# Patient Record
Sex: FEMALE | Race: WHITE | NOT HISPANIC OR LATINO | Employment: FULL TIME | URBAN - NONMETROPOLITAN AREA
[De-identification: names, ages, dates, MRNs, and addresses within clinical notes are randomized per-mention and may not be internally consistent; named-entity substitution may affect disease eponyms.]

---

## 2022-03-08 LAB — CHLAMYDIA /N. GONORRHOEAE SCREEN: NEGATIVE

## 2023-10-17 LAB
HUMAN PAPILLOMAVIRUS (HPV): NORMAL
PAP RECOMMENDATION EXT: NORMAL

## 2024-02-08 ENCOUNTER — OFFICE VISIT (OUTPATIENT)
Dept: FAMILY MEDICINE | Facility: CLINIC | Age: 24
End: 2024-02-08
Payer: OTHER GOVERNMENT

## 2024-02-08 VITALS
BODY MASS INDEX: 23.36 KG/M2 | TEMPERATURE: 98 F | OXYGEN SATURATION: 100 % | DIASTOLIC BLOOD PRESSURE: 81 MMHG | SYSTOLIC BLOOD PRESSURE: 123 MMHG | RESPIRATION RATE: 17 BRPM | HEIGHT: 64 IN | WEIGHT: 136.81 LBS | HEART RATE: 64 BPM

## 2024-02-08 DIAGNOSIS — Z13.220 SCREENING, LIPID: ICD-10-CM

## 2024-02-08 DIAGNOSIS — R45.89 DEPRESSED MOOD: ICD-10-CM

## 2024-02-08 DIAGNOSIS — E28.39 ESTROGEN DEFICIENCY: Primary | ICD-10-CM

## 2024-02-08 DIAGNOSIS — R53.83 FATIGUE, UNSPECIFIED TYPE: ICD-10-CM

## 2024-02-08 DIAGNOSIS — R79.89 ELEVATED SERUM FREE T4 LEVEL: ICD-10-CM

## 2024-02-08 PROBLEM — S83.512A LEFT ACL TEAR: Status: RESOLVED | Noted: 2018-10-16 | Resolved: 2024-02-08

## 2024-02-08 PROBLEM — S83.512A LEFT ACL TEAR: Status: ACTIVE | Noted: 2018-10-16

## 2024-02-08 LAB
ALBUMIN SERPL BCP-MCNC: 3.8 G/DL (ref 3.5–5)
ALBUMIN/GLOB SERPL: 1.1 {RATIO}
ALP SERPL-CCNC: 50 U/L (ref 52–144)
ALT SERPL W P-5'-P-CCNC: 21 U/L (ref 13–56)
ANION GAP SERPL CALCULATED.3IONS-SCNC: 7 MMOL/L (ref 7–16)
AST SERPL W P-5'-P-CCNC: 23 U/L (ref 15–37)
BILIRUB SERPL-MCNC: 0.3 MG/DL (ref ?–1.2)
BUN SERPL-MCNC: 14 MG/DL (ref 7–18)
BUN/CREAT SERPL: 18 (ref 6–20)
CALCIUM SERPL-MCNC: 9.2 MG/DL (ref 8.5–10.1)
CHLORIDE SERPL-SCNC: 106 MMOL/L (ref 98–107)
CHOLEST SERPL-MCNC: 164 MG/DL (ref 0–200)
CHOLEST/HDLC SERPL: 2.3 {RATIO}
CO2 SERPL-SCNC: 29 MMOL/L (ref 21–32)
CREAT SERPL-MCNC: 0.76 MG/DL (ref 0.55–1.02)
EGFR (NO RACE VARIABLE) (RUSH/TITUS): 114 ML/MIN/1.73M2
GLOBULIN SER-MCNC: 3.6 G/DL (ref 2–4)
GLUCOSE SERPL-MCNC: 88 MG/DL (ref 74–106)
HDLC SERPL-MCNC: 70 MG/DL (ref 40–60)
LDLC SERPL CALC-MCNC: 83 MG/DL
LDLC/HDLC SERPL: 1.2 {RATIO}
NONHDLC SERPL-MCNC: 94 MG/DL
POTASSIUM SERPL-SCNC: 4 MMOL/L (ref 3.5–5.1)
PROT SERPL-MCNC: 7.4 G/DL (ref 6.4–8.2)
SODIUM SERPL-SCNC: 138 MMOL/L (ref 136–145)
T4 FREE SERPL-MCNC: 1.1 NG/DL (ref 0.76–1.46)
TRIGL SERPL-MCNC: 54 MG/DL (ref 35–150)
TSH SERPL DL<=0.005 MIU/L-ACNC: 1.49 UIU/ML (ref 0.36–3.74)
VLDLC SERPL-MCNC: 11 MG/DL

## 2024-02-08 PROCEDURE — 84439 ASSAY OF FREE THYROXINE: CPT | Mod: ,,, | Performed by: CLINICAL MEDICAL LABORATORY

## 2024-02-08 PROCEDURE — 84443 ASSAY THYROID STIM HORMONE: CPT | Mod: ,,, | Performed by: CLINICAL MEDICAL LABORATORY

## 2024-02-08 PROCEDURE — 80061 LIPID PANEL: CPT | Mod: ,,, | Performed by: CLINICAL MEDICAL LABORATORY

## 2024-02-08 PROCEDURE — 80053 COMPREHEN METABOLIC PANEL: CPT | Mod: ,,, | Performed by: CLINICAL MEDICAL LABORATORY

## 2024-02-08 PROCEDURE — 99204 OFFICE O/P NEW MOD 45 MIN: CPT | Mod: ,,, | Performed by: STUDENT IN AN ORGANIZED HEALTH CARE EDUCATION/TRAINING PROGRAM

## 2024-02-08 RX ORDER — ESCITALOPRAM OXALATE 5 MG/1
5 TABLET ORAL DAILY
Qty: 30 TABLET | Refills: 0 | Status: SHIPPED | OUTPATIENT
Start: 2024-02-08 | End: 2024-03-07

## 2024-02-08 NOTE — Clinical Note
Please make sure referral team takes care of this patient's referral ASAP. Please refer to [facility] in [place], [place], Fax [phone]. Patient already has an appt. She just needs referral for insurance purposes.

## 2024-02-08 NOTE — PROGRESS NOTES
Health Maintenance Due   Topic Date Due    Hepatitis C Screening  Never done    Lipid Panel  Never done    COVID-19 Vaccine (1) Never done    TETANUS VACCINE  Never done    Pap Smear  Never done    Influenza Vaccine (1) 09/01/2023     Care gaps reviewed. Patient declined vaccines at this time.

## 2024-02-08 NOTE — Clinical Note
Please obtain records from [facility] in [place] and from [facility] in [place], [place].

## 2024-02-08 NOTE — PROGRESS NOTES
Progress Note     ANDI COX MD   [address]  [place] [address]     PATIENT NAME: Vilma Mcfarland  : 2000  DATE: 24  MRN: [id number]      Billing Provider: ANDI COX MD  Level of Service:   Patient PCP Information       Provider PCP Type    ANDI COX MD General                Establish Care (Patient reports needing to establish care for PCP) and Referral (Patient needs referral to OBGYN./Patient went to urgent care and was told her estrogen levels were low. /She called to make an appointment with an OB but her insurance required she get a referral from a PCP. /Patient wants to be referred to [facility] in [place], [place]  fax- [phone])      SUBJECTIVE:     Vilma Mcfarland is a 23 y.o.female who presents to clinic for Establish Care (Patient reports needing to establish care for PCP) and Referral (Patient needs referral to OBGYN./Patient went to urgent care and was told her estrogen levels were low. /She called to make an appointment with an OB but her insurance required she get a referral from a PCP. /Patient wants to be referred to [facility] in [place], [place]  fax- [phone])    Patient presents to clinic today for referral to OBGYN.  Patient was evaluated at [facility] in [place].  Patient was having some fatigue and presented to [facility] in [place].  They did blood work including estradiol, B12, TSH, vitamin-D, and testosterone.  They also performed a CBC.  Patient's CBC was unremarkable. B12 375,TSH 2.27,T4 12.3, Vit D 63,Test 0.4, estradiol <15.  At the time her blood work was drawn she was approximately 2 weeks into her cycle.  Her provider recommended that she be evaluated by OBGYN for estrogen deficiency.  Patient is needing referral for insurance purposes.  Patient already has an appointment with OBGYN.  Patient is currently on birth control.  She has been on combined oral contraceptive pills since high  "school.    Had pap smear last year. It was performed at [facility], [place].     Patient notes that she continues to have fatigue depressed mood.  She states that she has always had difficulty with these symptoms.  She gets quite irritable with her significant other as well as family and friends.  Patient states that she has little desire to go out and been time with others.  She also has some difficulty concentrating.  She wakes frequently throughout the night.  Patient denies any significant anxiety but did have some difficulty sleeping secondary to racing thoughts when she was in school.  That has improved since she graduated.  Patient has associated fatigue. No SI.     All other pertinent review of systems negative. Please see HPI for details.     Past Medical History:  has a past medical history of Left ACL tear (10/16/2018).   Past Surgical History:  has a past surgical history that includes Anterior cruciate ligament repair (Left, 2019).  Family History: family history is not on file.  Social History:  reports that she quit smoking about 2 years ago. Her smoking use included vaping with nicotine. She started smoking about 4 years ago. She has never used smokeless tobacco. She reports current alcohol use of about 3.0 standard drinks of alcohol per week. She reports that she does not use drugs.  Allergies: Review of patient's allergies indicates:  No Known Allergies      Current Outpatient Medications:     ethinyl estradiol/drospirenone (STACI ORAL), Take 0.03 mg by mouth once daily., Disp: , Rfl:     EScitalopram oxalate (LEXAPRO) 5 MG Tab, Take 1 tablet (5 mg total) by mouth once daily., Disp: 30 tablet, Rfl: 0   OBJECTIVE:     Vital Signs   /81 (BP Location: Right arm, Patient Position: Sitting, BP Method: Medium (Automatic))   Pulse 64   Temp 98.2 °F (36.8 °C) (Temporal)   Resp 17   Ht 5' 4" (1.626 m)   Wt 62.1 kg (136 lb 12.8 oz)   SpO2 100%   BMI 23.48 kg/m² "     Physical Exam  Constitutional:       General: She is not in acute distress.     Appearance: Normal appearance. She is not ill-appearing, toxic-appearing or diaphoretic.   HENT:      Head: Normocephalic and atraumatic.      Mouth/Throat:      Mouth: Mucous membranes are moist.      Pharynx: No oropharyngeal exudate or posterior oropharyngeal erythema.   Eyes:      Extraocular Movements: Extraocular movements intact.      Pupils: Pupils are equal, round, and reactive to light.   Cardiovascular:      Rate and Rhythm: Normal rate and regular rhythm.      Pulses: Normal pulses.      Heart sounds: Normal heart sounds. No murmur heard.     No friction rub. No gallop.   Pulmonary:      Effort: Pulmonary effort is normal. No respiratory distress.      Breath sounds: No wheezing, rhonchi or rales.   Abdominal:      General: Abdomen is flat.      Palpations: Abdomen is soft.      Tenderness: There is no abdominal tenderness. There is no guarding or rebound.   Musculoskeletal:         General: Normal range of motion.      Cervical back: Normal range of motion.   Skin:     General: Skin is warm and dry.      Capillary Refill: Capillary refill takes less than 2 seconds.   Neurological:      General: No focal deficit present.      Mental Status: She is alert.   Psychiatric:         Mood and Affect: Mood normal.         Behavior: Behavior normal.         ASSESSMENT/PLAN:     1. Estrogen deficiency  -     Ambulatory referral/consult to Obstetrics / Gynecology; Future; Expected date: 02/15/2024    Patient with low estrogen level on blood work.  It was recommended patient follow up with OBGYN.  Patient already has an appointment with OBGYN.  Patient is on combined oral contraceptive pills which is possibly contributing to her decreased estradiol level.  Will refer per patient request.    2. Depressed mood  -     EScitalopram oxalate (LEXAPRO) 5 MG Tab; Take 1 tablet (5 mg total) by mouth once daily.  Dispense: 30 tablet; Refill:  0  Patient with depressed mood.  We will treat with low-dose Lexapro see if she is improvement in symptoms.  Discussed potential risks and side effects of this medication including black box warning.  Patient verbalized understanding.  Patient to follow up in 1 month for recheck.    3. Fatigue, unspecified type  -     Comprehensive Metabolic Panel; Future; Expected date: 02/08/2024  Patient with fatigue.  Patient with recent blood work which showed low normal vitamin B12 level.  Patient to start vitamin B12 supplementation.  We will also obtain CMP as that was not obtained during her routine blood work recently.  We will also obtain repeat thyroid level given abnormal T4.    4. Screening, lipid  -     Lipid Panel; Future; Expected date: 02/08/2024    5. Elevated serum free T4 level  -     TSH; Future; Expected date: 02/08/2024  -     T4, Free; Future; Expected date: 02/08/2024        Follow up in about 4 weeks (around 3/7/2024) for Recheck depression.      ANDI COX MD  02/08/2024    Due to voice recognition software, sound alike and misspelled words may be contained in the documentation.

## 2024-02-09 NOTE — PROGRESS NOTES
Please let patient know that her electrolytes, kidney function, and liver enzymes are normal.  Her alkaline phosphatase is mildly low.  We will need to repeat this level at her follow up visit to make sure it returns to normal.  Her cholesterol is normal.  Her thyroid level has also normalized.

## 2024-02-23 ENCOUNTER — PATIENT OUTREACH (OUTPATIENT)
Dept: ADMINISTRATIVE | Facility: HOSPITAL | Age: 24
End: 2024-02-23

## 2024-02-23 NOTE — PROGRESS NOTES
02/23/2024   --Chart accessed for: Care gaps  --Care Gaps addressed:pap smear, HPV, Chlamydia Screening.  Care Everywhere updates requested and reviewed.  [facility] reviewed.  Immunization Database ([employer]/MIXX) reviewed. Vaccinations uploaded:n/a  Next appointment 3/7/2024 . Appointment notes updated to include: due for chlamydia screening and Hep C    Health Maintenance Due   Topic Date Due    Hepatitis C Screening  Never done    COVID-19 Vaccine (1) Never done    Chlamydia Screening  03/04/2023    Influenza Vaccine (1) 09/01/2023

## 2024-03-07 ENCOUNTER — OFFICE VISIT (OUTPATIENT)
Dept: FAMILY MEDICINE | Facility: CLINIC | Age: 24
End: 2024-03-07
Payer: OTHER GOVERNMENT

## 2024-03-07 VITALS
DIASTOLIC BLOOD PRESSURE: 65 MMHG | HEIGHT: 64 IN | WEIGHT: 135 LBS | SYSTOLIC BLOOD PRESSURE: 104 MMHG | HEART RATE: 71 BPM | TEMPERATURE: 98 F | RESPIRATION RATE: 18 BRPM | OXYGEN SATURATION: 98 % | BODY MASS INDEX: 23.05 KG/M2

## 2024-03-07 DIAGNOSIS — Z11.3 SCREENING EXAMINATION FOR STD (SEXUALLY TRANSMITTED DISEASE): ICD-10-CM

## 2024-03-07 DIAGNOSIS — R45.89 DEPRESSED MOOD: Primary | ICD-10-CM

## 2024-03-07 DIAGNOSIS — Z30.09 BIRTH CONTROL COUNSELING: ICD-10-CM

## 2024-03-07 PROCEDURE — 99214 OFFICE O/P EST MOD 30 MIN: CPT | Mod: ,,, | Performed by: STUDENT IN AN ORGANIZED HEALTH CARE EDUCATION/TRAINING PROGRAM

## 2024-03-07 PROCEDURE — 87491 CHLMYD TRACH DNA AMP PROBE: CPT | Mod: ,,, | Performed by: CLINICAL MEDICAL LABORATORY

## 2024-03-07 PROCEDURE — 87591 N.GONORRHOEAE DNA AMP PROB: CPT | Mod: ,,, | Performed by: CLINICAL MEDICAL LABORATORY

## 2024-03-07 PROCEDURE — 86803 HEPATITIS C AB TEST: CPT | Mod: ,,, | Performed by: CLINICAL MEDICAL LABORATORY

## 2024-03-07 RX ORDER — ESCITALOPRAM OXALATE 10 MG/1
10 TABLET ORAL DAILY
Qty: 30 TABLET | Refills: 2 | Status: SHIPPED | OUTPATIENT
Start: 2024-03-07 | End: 2024-05-30

## 2024-03-07 RX ORDER — DROSPIRENONE AND ETHINYL ESTRADIOL 0.03MG-3MG
1 KIT ORAL DAILY
Qty: 28 TABLET | Refills: 5 | Status: SHIPPED | OUTPATIENT
Start: 2024-03-07 | End: 2025-03-07

## 2024-03-07 RX ORDER — DROSPIRENONE AND ETHINYL ESTRADIOL 0.02-3(28)
KIT ORAL
COMMUNITY
Start: 2023-12-14 | End: 2024-03-07

## 2024-03-07 NOTE — Clinical Note
Patient states she had her covid vaccine done at [facility] in [place]. She may have her flu vaccine as well we will have to look it up.

## 2024-03-07 NOTE — PROGRESS NOTES
Progress Note     ANDI COX MD   [address]  [place] [address]     PATIENT NAME: Vilma Mcfarland  : 2000  DATE: 3/7/24  MRN: [id number]      Billing Provider: ANDI COX MD  Level of Service:   Patient PCP Information       Provider PCP Type    ANDI COX MD General                Follow-up (Patient states she is here for a follow up for the new medicine that she was put on. Patient states that her sleeping has not gotten better she's still tossing and turning.Patient states the depression she really haven't  noticed anything with that. Patient states she would like to be scheduled for a pap smear.) and Health Maintenance (Patient states that she's interested in getting her chlamydia screening and Hepatitis C screening.)      SUBJECTIVE:     Vilma Mcfarland is a 23 y.o.female who presents to clinic for Follow-up (Patient states she is here for a follow up for the new medicine that she was put on. Patient states that her sleeping has not gotten better she's still tossing and turning.Patient states the depression she really haven't  noticed anything with that. Patient states she would like to be scheduled for a pap smear.) and Health Maintenance (Patient states that she's interested in getting her chlamydia screening and Hepatitis C screening.)    Patient presents to clinic today for follow up depression.  Patient was started on Lexapro 5 mg approximately 1 month ago.  Patient notes that she has noticed some mild improvement but continues to have difficulty with sleep.  She was tolerating the medication without difficulty.  Patient is amenable to increasing the dosage.    Patient would also like to have her hepatitis-C and chlamydia screening performed today.  Patient has had chlamydia in the past per chart review.  Patient is up-to-date on Pap smear in last had Pap smear performed in 2022.  Patient does take oral contraceptive pills.  Patient is  "requesting refill.    All other pertinent review of systems negative. Please see HPI for details.     Past Medical History:  has a past medical history of Left ACL tear (10/16/2018).   Past Surgical History:  has a past surgical history that includes Anterior cruciate ligament repair (Left, 2019).  Family History: family history is not on file.  Social History:  reports that she quit smoking about 2 years ago. Her smoking use included vaping with nicotine. She started smoking about 4 years ago. She has never used smokeless tobacco. She reports current alcohol use of about 3.0 standard drinks of alcohol per week. She reports that she does not use drugs.  Allergies: Review of patient's allergies indicates:  No Known Allergies      Current Outpatient Medications:     drospirenone-ethinyl estradioL (STACI) 3-0.03 mg per tablet, Take 1 tablet by mouth once daily., Disp: 28 tablet, Rfl: 5    EScitalopram oxalate (LEXAPRO) 10 MG tablet, Take 1 tablet (10 mg total) by mouth once daily., Disp: 30 tablet, Rfl: 2   OBJECTIVE:     Vital Signs   /65 (BP Location: Right arm, Patient Position: Sitting, BP Method: Medium (Automatic))   Pulse 71   Temp 98.1 °F (36.7 °C) (Oral)   Resp 18   Ht 5' 4" (1.626 m)   Wt 61.2 kg (135 lb)   LMP 03/03/2024 (Exact Date)   SpO2 98%   BMI 23.17 kg/m²     Physical Exam  Constitutional:       General: She is not in acute distress.     Appearance: Normal appearance. She is not ill-appearing, toxic-appearing or diaphoretic.   HENT:      Head: Normocephalic and atraumatic.      Right Ear: Tympanic membrane normal.      Left Ear: Tympanic membrane normal.      Nose: No congestion or rhinorrhea.      Mouth/Throat:      Mouth: Mucous membranes are moist.      Pharynx: No oropharyngeal exudate or posterior oropharyngeal erythema.   Eyes:      Extraocular Movements: Extraocular movements intact.      Pupils: Pupils are equal, round, and reactive to light.   Cardiovascular:      Rate and " Rhythm: Normal rate and regular rhythm.      Pulses: Normal pulses.      Heart sounds: Normal heart sounds. No murmur heard.     No friction rub. No gallop.   Pulmonary:      Effort: Pulmonary effort is normal. No respiratory distress.      Breath sounds: No wheezing, rhonchi or rales.   Abdominal:      General: Abdomen is flat.      Palpations: Abdomen is soft.      Tenderness: There is no abdominal tenderness. There is no guarding or rebound.   Musculoskeletal:         General: Normal range of motion.      Cervical back: Normal range of motion.   Skin:     General: Skin is warm and dry.      Capillary Refill: Capillary refill takes less than 2 seconds.   Neurological:      General: No focal deficit present.      Mental Status: She is alert.   Psychiatric:         Mood and Affect: Mood normal.         Behavior: Behavior normal.         ASSESSMENT/PLAN:     1. Depressed mood  -     EScitalopram oxalate (LEXAPRO) 10 MG tablet; Take 1 tablet (10 mg total) by mouth once daily.  Dispense: 30 tablet; Refill: 2    Patient currently tolerating Lexapro 5 mg.  Patient has noticed mild improvement in symptoms but continued some difficulty with sleep.  We will increase Lexapro to 10 mg daily.  Patient to follow up in 3 months if doing well.  Otherwise, patient to follow up in 1 month for recheck to potentially adjust medication dosage versus start adjunct or transition to an alternative.    2. Birth control counseling  -     drospirenone-ethinyl estradioL (STACI) 3-0.03 mg per tablet; Take 1 tablet by mouth once daily.  Dispense: 28 tablet; Refill: 5  Patient requesting refill for her birth control.  Patient is currently taking her medication has not missed any doses.    3. Screening examination for STD (sexually transmitted disease)  -     Chlamydia/GC, PCR  -     Hepatitis C Antibody; Future; Expected date: 03/07/2024  Patient requesting STI screening for gonorrhea/chlamydia and hepatitis-C.  We will obtain.        Follow up  in about 3 months (around 6/7/2024) for Recheck Depression.      ANDI COX MD  03/07/2024    Due to voice recognition software, sound alike and misspelled words may be contained in the documentation.

## 2024-03-08 LAB — HCV AB SER QL: NORMAL

## 2024-03-09 LAB
CHLAMYDIA BY PCR: NEGATIVE
N. GONORRHOEAE (GC) BY PCR: NEGATIVE

## 2024-05-30 DIAGNOSIS — R45.89 DEPRESSED MOOD: ICD-10-CM

## 2024-05-30 RX ORDER — ESCITALOPRAM OXALATE 10 MG/1
10 TABLET ORAL
Qty: 30 TABLET | Refills: 2 | Status: SHIPPED | OUTPATIENT
Start: 2024-05-30 | End: 2024-06-07 | Stop reason: SDUPTHER

## 2024-06-07 ENCOUNTER — OFFICE VISIT (OUTPATIENT)
Dept: FAMILY MEDICINE | Facility: CLINIC | Age: 24
End: 2024-06-07
Payer: OTHER GOVERNMENT

## 2024-06-07 VITALS
OXYGEN SATURATION: 98 % | HEIGHT: 64 IN | DIASTOLIC BLOOD PRESSURE: 74 MMHG | RESPIRATION RATE: 18 BRPM | SYSTOLIC BLOOD PRESSURE: 118 MMHG | HEART RATE: 60 BPM | BODY MASS INDEX: 23.67 KG/M2 | TEMPERATURE: 98 F | WEIGHT: 138.63 LBS

## 2024-06-07 DIAGNOSIS — N89.8 VAGINAL DISCHARGE: Primary | ICD-10-CM

## 2024-06-07 DIAGNOSIS — R45.89 DEPRESSED MOOD: ICD-10-CM

## 2024-06-07 LAB
CANDIDA SPECIES: NEGATIVE
GARDNERELLA: POSITIVE
TRICHOMONAS: NEGATIVE

## 2024-06-07 PROCEDURE — 99213 OFFICE O/P EST LOW 20 MIN: CPT | Mod: ,,, | Performed by: STUDENT IN AN ORGANIZED HEALTH CARE EDUCATION/TRAINING PROGRAM

## 2024-06-07 PROCEDURE — 87660 TRICHOMONAS VAGIN DIR PROBE: CPT | Mod: 59,,, | Performed by: CLINICAL MEDICAL LABORATORY

## 2024-06-07 PROCEDURE — 87591 N.GONORRHOEAE DNA AMP PROB: CPT | Mod: ,,, | Performed by: CLINICAL MEDICAL LABORATORY

## 2024-06-07 PROCEDURE — 87661 TRICHOMONAS VAGINALIS AMPLIF: CPT | Mod: ,,, | Performed by: CLINICAL MEDICAL LABORATORY

## 2024-06-07 PROCEDURE — 87510 GARDNER VAG DNA DIR PROBE: CPT | Mod: ,,, | Performed by: CLINICAL MEDICAL LABORATORY

## 2024-06-07 PROCEDURE — 87491 CHLMYD TRACH DNA AMP PROBE: CPT | Mod: ,,, | Performed by: CLINICAL MEDICAL LABORATORY

## 2024-06-07 PROCEDURE — 87480 CANDIDA DNA DIR PROBE: CPT | Mod: ,,, | Performed by: CLINICAL MEDICAL LABORATORY

## 2024-06-07 RX ORDER — DROSPIRENONE AND ETHINYL ESTRADIOL 0.02-3(28)
1 KIT ORAL
COMMUNITY
Start: 2024-05-07 | End: 2024-06-07

## 2024-06-07 RX ORDER — ESCITALOPRAM OXALATE 10 MG/1
10 TABLET ORAL DAILY
Qty: 90 TABLET | Refills: 1 | Status: SHIPPED | OUTPATIENT
Start: 2024-06-07

## 2024-06-07 NOTE — PROGRESS NOTES
Progress Note     ANDI COX MD   [address]  [place] [address]     PATIENT NAME: Vilma Mcfarland  : 2000  DATE: 24  MRN: [id number]      Billing Provider: ANDI COX MD  Level of Service:   Patient PCP Information       Provider PCP Type    ADNI COX MD General                Vaginal Discharge (Abnormal vaginal discharge/No burning /No itching/), Follow-up, Anxiety (Follow up /Pt mentioned everything is going on well and the lexapro work perfectly fine ), and Health Maintenance (Pt not interested in the covid booster )      SUBJECTIVE:     Vilma Mcfarland is a 24 y.o.female who presents to clinic for Vaginal Discharge (Abnormal vaginal discharge/No burning /No itching/), Follow-up, Anxiety (Follow up /Pt mentioned everything is going on well and the lexapro work perfectly fine ), and Health Maintenance (Pt not interested in the covid booster )        Patient presents to clinic today for follow up for depression.  Patient notes that symptoms are much improved with Lexapro.  She   Notes she is tolerating the medication without difficulty and notes significant improvement in ability to sleep. Her mood is much improved as well.      Patient was also having some vaginal discharge without any associated burning/itching.  Patient was swimming during Memorial day weekend and believes that has precipitated the discharge.  She notes it was thin and white in color.  She was sexually active but it was in a stable relationship.  Patient was amenable to STI testing in addition to BD affirm.    All other pertinent review of systems negative. Please see HPI for details.     Past Medical History:  has a past medical history of Left ACL tear (10/16/2018).   Past Surgical History:  has a past surgical history that includes Anterior cruciate ligament repair (Left, 2019).  Family History: family history is not on file.  Social History:  reports that she quit smoking about 2  "years ago. Her smoking use included vaping with nicotine. She started smoking about 4 years ago. She has never used smokeless tobacco. She reports current alcohol use of about 3.0 standard drinks of alcohol per week. She reports that she does not use drugs.  Allergies: Review of patient's allergies indicates:  No Known Allergies      Current Outpatient Medications:     drospirenone-ethinyl estradioL (STACI) 3-0.03 mg per tablet, Take 1 tablet by mouth once daily., Disp: 28 tablet, Rfl: 5    EScitalopram oxalate (LEXAPRO) 10 MG tablet, Take 1 tablet (10 mg total) by mouth once daily., Disp: 90 tablet, Rfl: 1   OBJECTIVE:     Vital Signs   /74   Pulse 60   Temp 97.6 °F (36.4 °C)   Resp 18   Ht 5' 4" (1.626 m)   Wt 62.9 kg (138 lb 9.6 oz)   SpO2 98%   BMI 23.79 kg/m²     Physical Exam  Constitutional:       General: She is not in acute distress.     Appearance: Normal appearance. She is not ill-appearing, toxic-appearing or diaphoretic.   HENT:      Head: Normocephalic and atraumatic.   Eyes:      Extraocular Movements: Extraocular movements intact.      Pupils: Pupils are equal, round, and reactive to light.   Cardiovascular:      Rate and Rhythm: Normal rate and regular rhythm.      Pulses: Normal pulses.      Heart sounds: Normal heart sounds. No murmur heard.     No friction rub. No gallop.   Pulmonary:      Effort: Pulmonary effort is normal. No respiratory distress.      Breath sounds: No wheezing, rhonchi or rales.   Abdominal:      General: Abdomen is flat.      Palpations: Abdomen is soft.      Tenderness: There is no abdominal tenderness. There is no guarding or rebound.   Musculoskeletal:         General: Normal range of motion.      Cervical back: Normal range of motion.   Skin:     General: Skin is warm and dry.      Capillary Refill: Capillary refill takes less than 2 seconds.   Neurological:      General: No focal deficit present.      Mental Status: She is alert.   Psychiatric:         Mood " and Affect: Mood normal.         Behavior: Behavior normal.         ASSESSMENT/PLAN:     1. Vaginal discharge  -     Chlamydia/GC, PCR  -     Cancel: Trichomonas vaginalis by PCR; Future; Expected date: 06/07/2024  -     Bacterial Vaginosis    Obtaining GC/chlamydia and BD affirm for further evaluation.  We will treat if indicated.      2. Depressed mood  -     EScitalopram oxalate (LEXAPRO) 10 MG tablet; Take 1 tablet (10 mg total) by mouth once daily.  Dispense: 90 tablet; Refill: 1  Patient doing well with Lexapro.  Refill provided.  Patient to follow up if symptoms worsen.      Follow up in about 6 months (around 12/7/2024) for FU depression.      ANDI COX MD  06/07/2024    Due to voice recognition software, sound alike and misspelled words may be contained in the documentation.

## 2024-06-08 DIAGNOSIS — B96.89 BV (BACTERIAL VAGINOSIS): Primary | ICD-10-CM

## 2024-06-08 DIAGNOSIS — N76.0 BV (BACTERIAL VAGINOSIS): Primary | ICD-10-CM

## 2024-06-08 LAB
CHLAMYDIA BY PCR: NEGATIVE
N. GONORRHOEAE (GC) BY PCR: NEGATIVE
TRICHOMONAS NAT: NEGATIVE

## 2024-06-08 RX ORDER — METRONIDAZOLE 500 MG/1
500 TABLET ORAL EVERY 12 HOURS
Qty: 14 TABLET | Refills: 0 | Status: SHIPPED | OUTPATIENT
Start: 2024-06-08 | End: 2024-06-15

## 2024-09-02 DIAGNOSIS — R45.89 DEPRESSED MOOD: ICD-10-CM

## 2024-09-03 RX ORDER — ESCITALOPRAM OXALATE 10 MG/1
10 TABLET ORAL
Qty: 90 TABLET | Refills: 0 | Status: SHIPPED | OUTPATIENT
Start: 2024-09-03

## 2024-09-06 ENCOUNTER — PATIENT MESSAGE (OUTPATIENT)
Dept: FAMILY MEDICINE | Facility: CLINIC | Age: 24
End: 2024-09-06
Payer: OTHER GOVERNMENT

## 2024-11-01 ENCOUNTER — OFFICE VISIT (OUTPATIENT)
Dept: FAMILY MEDICINE | Facility: CLINIC | Age: 24
End: 2024-11-01
Payer: OTHER GOVERNMENT

## 2024-11-01 VITALS
TEMPERATURE: 98 F | DIASTOLIC BLOOD PRESSURE: 80 MMHG | BODY MASS INDEX: 23.9 KG/M2 | WEIGHT: 140 LBS | HEART RATE: 87 BPM | OXYGEN SATURATION: 98 % | HEIGHT: 64 IN | SYSTOLIC BLOOD PRESSURE: 118 MMHG | RESPIRATION RATE: 18 BRPM

## 2024-11-01 DIAGNOSIS — F41.9 ANXIETY: Primary | ICD-10-CM

## 2024-11-01 DIAGNOSIS — R45.89 DEPRESSED MOOD: ICD-10-CM

## 2024-11-01 RX ORDER — ESCITALOPRAM OXALATE 10 MG/1
10 TABLET ORAL DAILY
Qty: 90 TABLET | Refills: 1 | Status: SHIPPED | OUTPATIENT
Start: 2024-11-01

## 2024-11-01 RX ORDER — BUSPIRONE HYDROCHLORIDE 5 MG/1
5 TABLET ORAL 2 TIMES DAILY
Qty: 60 TABLET | Refills: 3 | Status: SHIPPED | OUTPATIENT
Start: 2024-11-01 | End: 2025-11-01

## 2024-11-17 DIAGNOSIS — Z30.09 BIRTH CONTROL COUNSELING: ICD-10-CM

## 2024-11-18 RX ORDER — DROSPIRENONE AND ETHINYL ESTRADIOL 0.03MG-3MG
1 KIT ORAL
Qty: 28 TABLET | Refills: 2 | Status: SHIPPED | OUTPATIENT
Start: 2024-11-18

## 2024-11-20 ENCOUNTER — PATIENT MESSAGE (OUTPATIENT)
Dept: FAMILY MEDICINE | Facility: CLINIC | Age: 24
End: 2024-11-20
Payer: OTHER GOVERNMENT

## 2025-03-11 ENCOUNTER — OFFICE VISIT (OUTPATIENT)
Dept: FAMILY MEDICINE | Facility: CLINIC | Age: 25
End: 2025-03-11
Payer: OTHER GOVERNMENT

## 2025-03-11 VITALS
HEIGHT: 64 IN | OXYGEN SATURATION: 100 % | HEART RATE: 63 BPM | SYSTOLIC BLOOD PRESSURE: 121 MMHG | RESPIRATION RATE: 20 BRPM | WEIGHT: 127.38 LBS | TEMPERATURE: 98 F | DIASTOLIC BLOOD PRESSURE: 73 MMHG | BODY MASS INDEX: 21.75 KG/M2

## 2025-03-11 DIAGNOSIS — R45.89 DEPRESSED MOOD: Primary | ICD-10-CM

## 2025-03-11 DIAGNOSIS — R61 NIGHT SWEATS: ICD-10-CM

## 2025-03-11 DIAGNOSIS — F41.9 ANXIETY: ICD-10-CM

## 2025-03-11 DIAGNOSIS — Z30.09 BIRTH CONTROL COUNSELING: ICD-10-CM

## 2025-03-11 LAB
ALBUMIN SERPL BCP-MCNC: 4.3 G/DL (ref 3.5–5)
ALBUMIN/GLOB SERPL: 1.3 {RATIO}
ALP SERPL-CCNC: 45 U/L (ref 40–150)
ALT SERPL W P-5'-P-CCNC: 15 U/L
ANION GAP SERPL CALCULATED.3IONS-SCNC: 12 MMOL/L (ref 7–16)
AST SERPL W P-5'-P-CCNC: 32 U/L (ref 11–45)
B-HCG UR QL: NEGATIVE
BASOPHILS # BLD AUTO: 0.04 K/UL (ref 0–0.2)
BASOPHILS NFR BLD AUTO: 0.8 % (ref 0–1)
BILIRUB SERPL-MCNC: 0.5 MG/DL
BUN SERPL-MCNC: 13 MG/DL (ref 7–19)
BUN/CREAT SERPL: 15 (ref 6–20)
CALCIUM SERPL-MCNC: 10 MG/DL (ref 8.4–10.2)
CHLORIDE SERPL-SCNC: 104 MMOL/L (ref 98–107)
CO2 SERPL-SCNC: 25 MMOL/L (ref 22–29)
CREAT SERPL-MCNC: 0.84 MG/DL (ref 0.55–1.02)
CTP QC/QA: YES
DIFFERENTIAL METHOD BLD: ABNORMAL
EGFR (NO RACE VARIABLE) (RUSH/TITUS): 101 ML/MIN/1.73M2
EOSINOPHIL # BLD AUTO: 0.04 K/UL (ref 0–0.5)
EOSINOPHIL NFR BLD AUTO: 0.8 % (ref 1–4)
ERYTHROCYTE [DISTWIDTH] IN BLOOD BY AUTOMATED COUNT: 11.5 % (ref 11.5–14.5)
GLOBULIN SER-MCNC: 3.2 G/DL (ref 2–4)
GLUCOSE SERPL-MCNC: 85 MG/DL (ref 74–100)
HCT VFR BLD AUTO: 42.9 % (ref 38–47)
HGB BLD-MCNC: 14.2 G/DL (ref 12–16)
IMM GRANULOCYTES # BLD AUTO: 0.01 K/UL (ref 0–0.04)
IMM GRANULOCYTES NFR BLD: 0.2 % (ref 0–0.4)
LYMPHOCYTES # BLD AUTO: 1.77 K/UL (ref 1–4.8)
LYMPHOCYTES NFR BLD AUTO: 33.2 % (ref 27–41)
MCH RBC QN AUTO: 31.2 PG (ref 27–31)
MCHC RBC AUTO-ENTMCNC: 33.1 G/DL (ref 32–36)
MCV RBC AUTO: 94.3 FL (ref 80–96)
MONOCYTES # BLD AUTO: 0.34 K/UL (ref 0–0.8)
MONOCYTES NFR BLD AUTO: 6.4 % (ref 2–6)
MPC BLD CALC-MCNC: 10.6 FL (ref 9.4–12.4)
NEUTROPHILS # BLD AUTO: 3.13 K/UL (ref 1.8–7.7)
NEUTROPHILS NFR BLD AUTO: 58.6 % (ref 53–65)
NRBC # BLD AUTO: 0 X10E3/UL
NRBC, AUTO (.00): 0 %
PLATELET # BLD AUTO: 297 K/UL (ref 150–400)
POTASSIUM SERPL-SCNC: 4.3 MMOL/L (ref 3.5–5.1)
PROT SERPL-MCNC: 7.5 G/DL (ref 6.4–8.3)
RBC # BLD AUTO: 4.55 M/UL (ref 4.2–5.4)
SODIUM SERPL-SCNC: 137 MMOL/L (ref 136–145)
TSH SERPL DL<=0.005 MIU/L-ACNC: 2.71 UIU/ML (ref 0.35–4.94)
WBC # BLD AUTO: 5.33 K/UL (ref 4.5–11)

## 2025-03-11 PROCEDURE — 99214 OFFICE O/P EST MOD 30 MIN: CPT | Mod: ,,, | Performed by: STUDENT IN AN ORGANIZED HEALTH CARE EDUCATION/TRAINING PROGRAM

## 2025-03-11 PROCEDURE — 80050 GENERAL HEALTH PANEL: CPT | Mod: ,,, | Performed by: CLINICAL MEDICAL LABORATORY

## 2025-03-11 PROCEDURE — 81025 URINE PREGNANCY TEST: CPT | Mod: QW,,, | Performed by: STUDENT IN AN ORGANIZED HEALTH CARE EDUCATION/TRAINING PROGRAM

## 2025-03-11 RX ORDER — DROSPIRENONE AND ETHINYL ESTRADIOL 0.03MG-3MG
1 KIT ORAL DAILY
Qty: 28 TABLET | Refills: 11 | Status: SHIPPED | OUTPATIENT
Start: 2025-03-11

## 2025-03-11 RX ORDER — BUSPIRONE HYDROCHLORIDE 5 MG/1
5 TABLET ORAL 2 TIMES DAILY
Qty: 180 TABLET | Refills: 1 | Status: CANCELLED | OUTPATIENT
Start: 2025-03-11 | End: 2026-03-11

## 2025-03-11 RX ORDER — ESCITALOPRAM OXALATE 10 MG/1
10 TABLET ORAL DAILY
Qty: 90 TABLET | Refills: 1 | Status: SHIPPED | OUTPATIENT
Start: 2025-03-11

## 2025-03-11 NOTE — PROGRESS NOTES
Progress Note     ANDI GOMEZ MD   [address]  [place] [address]     PATIENT NAME: Vilma Mcfarland  : 2000  DATE: 3/11/25  MRN: [id number]      Billing Provider: ANDI GOMEZ MD  Level of Service:   Patient PCP Information       Provider PCP Type    ANDI GOMEZ MD General                Follow-up (Patient is to be seen for follow up and medication refills.) and Medication Problem (Pt states she's not sure if she want to keep taking the Busoar 5mg tab./Pt states she's having night sweats and extremely vivid dreams.)      SUBJECTIVE:     Vilma Mcfarland is a 24 y.o.female who presents to clinic for Follow-up (Patient is to be seen for follow up and medication refills.) and Medication Problem (Pt states she's not sure if she want to keep taking the Busoar 5mg tab./Pt states she's having night sweats and extremely vivid dreams.)    Patient presents to clinic today for follow up for anxiety/depression.  Patient is prescribed Lexapro.  Patient is also prescribed BuSpar.  She notes that when she started the BuSpar it helped with her symptoms.  However, she is concerned it may also be contributing to vivid dreams and night sweats.  She states night sweats started around the same time.  She ran out of her BuSpar last week.  However, she continues to have night sweats and vivid dreams.  Denies any difficulty tolerating Lexapro.  Patient states that she is picking at her fingers secondary to anxiety.  She states that her job is quite stressful.  She does have a history of elevated T4.  She has had some weight loss but notes that is intentional.  Patient is also requesting refill for her contraceptive medicine.  She notes that she is tolerating her birth control without difficulty.    All other pertinent review of systems negative. Please see HPI for details.     Past Medical History:  has a past medical history of Left ACL tear (10/16/2018).   Past  "Surgical History:  has a past surgical history that includes Anterior cruciate ligament repair (Left, 2019).  Family History: family history is not on file.  Social History:  reports that she quit smoking about 3 years ago. Her smoking use included vaping with nicotine. She started smoking about 5 years ago. She has never used smokeless tobacco. She reports current alcohol use of about 3.0 standard drinks of alcohol per week. She reports that she does not use drugs.  Allergies: Review of patient's allergies indicates:  No Known Allergies    Current Medications[1]   OBJECTIVE:     Vital Signs   /73 (BP Location: Left arm, Patient Position: Sitting)   Pulse 63   Temp 97.9 °F (36.6 °C)   Resp 20   Ht 5' 4" (1.626 m)   Wt 57.8 kg (127 lb 6.4 oz)   LMP 02/24/2025   SpO2 100%   BMI 21.87 kg/m²     Physical Exam  Constitutional:       General: She is not in acute distress.     Appearance: Normal appearance. She is not ill-appearing, toxic-appearing or diaphoretic.   HENT:      Head: Normocephalic and atraumatic.      Mouth/Throat:      Mouth: Mucous membranes are moist.      Pharynx: No oropharyngeal exudate or posterior oropharyngeal erythema.   Eyes:      Extraocular Movements: Extraocular movements intact.      Pupils: Pupils are equal, round, and reactive to light.   Cardiovascular:      Rate and Rhythm: Normal rate and regular rhythm.      Pulses: Normal pulses.      Heart sounds: Normal heart sounds. No murmur heard.     No friction rub. No gallop.   Pulmonary:      Effort: Pulmonary effort is normal. No respiratory distress.      Breath sounds: No wheezing, rhonchi or rales.   Abdominal:      General: Abdomen is flat.      Palpations: Abdomen is soft.      Tenderness: There is no abdominal tenderness. There is no guarding or rebound.   Musculoskeletal:         General: Normal range of motion.      Cervical back: Normal range of motion.   Skin:     General: Skin is warm and dry.      Capillary Refill: " Capillary refill takes less than 2 seconds.   Neurological:      General: No focal deficit present.      Mental Status: She is alert.   Psychiatric:         Mood and Affect: Mood normal.         Behavior: Behavior normal.         ASSESSMENT/PLAN:     1. Depressed mood  -     EScitalopram oxalate (LEXAPRO) 10 MG tablet; Take 1 tablet (10 mg total) by mouth once daily.  Dispense: 90 tablet; Refill: 1    2. Anxiety  Patient with anxiety and depressed mood.  Patient having potential side effects from BuSpar.  Patient may discontinue.  If side effects resolve, patient to notify us so we can adjust her Lexapro dosage if needed.  Otherwise, patient can restart BuSpar.    3. Birth control counseling  -     drospirenone-ethinyl estradioL (LORIN) 3-0.03 mg per tablet; Take 1 tablet by mouth once daily.  Dispense: 28 tablet; Refill: 11  -     POCT urine pregnancy  Pregnancy test obtained and negative.  Refill for contraception provided.    4. Night sweats  -     CBC Auto Differential; Future; Expected date: 03/11/2025  -     Comprehensive Metabolic Panel; Future; Expected date: 03/11/2025  -     TSH; Future; Expected date: 03/11/2025  Patient with night sweats.  Patient believes is secondary to her BuSpar.  Patient counseled to continue to hold BuSpar.  If symptoms resolve, we will adjust Lexapro dosage to make up for discontinued BuSpar.  If symptoms continue, can restart BuSpar to control anxiety/depression.  Also obtaining CBC, CMP, TSH for additional evaluation.  Patient to follow up if symptoms worsen or persist.      Follow up in about 3 months (around 6/11/2025).      ANDI GOMEZ MD  03/11/2025    Due to voice recognition software, sound alike and misspelled words may be contained in the documentation.              [1]   Current Outpatient Medications:     busPIRone (BUSPAR) 5 MG Tab, Take 1 tablet (5 mg total) by mouth 2 (two) times daily., Disp: 60 tablet, Rfl: 3    drospirenone-ethinyl estradioL (LORIN)  3-0.03 mg per tablet, Take 1 tablet by mouth once daily., Disp: 28 tablet, Rfl: 11    EScitalopram oxalate (LEXAPRO) 10 MG tablet, Take 1 tablet (10 mg total) by mouth once daily., Disp: 90 tablet, Rfl: 1

## 2025-03-12 ENCOUNTER — RESULTS FOLLOW-UP (OUTPATIENT)
Dept: FAMILY MEDICINE | Facility: CLINIC | Age: 25
End: 2025-03-12

## 2025-03-12 NOTE — PROGRESS NOTES
Please let patient know that her blood count is normal.  Her thyroid level is also normal.  Her electrolytes, kidney function, and liver enzymes are normal as well.  If her night sweats do not resolve over the next 2 weeks following discontinuation of her BuSpar, she will need to let us know so we can re-evaluate.  If her symptoms do resolve and she feels like she needs a higher dose for Lexapro, she can call and we can make that adjustment.

## 2025-04-02 ENCOUNTER — PATIENT MESSAGE (OUTPATIENT)
Dept: FAMILY MEDICINE | Facility: CLINIC | Age: 25
End: 2025-04-02
Payer: OTHER GOVERNMENT

## 2025-04-02 NOTE — TELEPHONE ENCOUNTER
She will need to continue to monitor her night sweats.  They will likely continue to get better.  If they do not, she will need to let us know.  In regard to her possibly having ADHD, she would have to have a formal ADHD evaluation prior to being prescribed any ADHD medication as we do not diagnose ADHD in adults in this clinic.  However, if she was formally diagnosed with ADHD after testing we would be able to prescribe her medication for ADHD.

## 2025-06-10 ENCOUNTER — PATIENT OUTREACH (OUTPATIENT)
Facility: HOSPITAL | Age: 25
End: 2025-06-10
Payer: OTHER GOVERNMENT

## 2025-06-10 NOTE — PROGRESS NOTES
Population Health Chart Review & Patient Outreach Details    Updates Requested / Reviewed:  [x]  Care Everywhere Updated & Reviewed  [x]  [facility] Reviewed  [x]   Reviewed      Health Maintenance Topics Addressed and Outreach Outcomes / Actions Taken:  Chlamydia Screening  [x] No documentation found in [facility], [facility], or [facility]where for Chlamydia Screening.      [x] Comment placed in chart that patient needs this. No upcoming appointment scheduled at this time.